# Patient Record
(demographics unavailable — no encounter records)

---

## 2024-12-06 NOTE — HISTORY OF PRESENT ILLNESS
[FreeTextEntry1] : 36 year male with a PMH Insomnia, ADHD, mild hyperlipidemia is coming in for  for pre-employment screening and immunizations. [de-identified] : 36 year male with a PMH Insomnia, ADHD, mild hyperlipidemia is coming in for  for pre-employment screening and immunizations.  He is requesting bw for tb screening, titers for work.   No other acute complaints.

## 2024-12-06 NOTE — HISTORY OF PRESENT ILLNESS
[FreeTextEntry1] : 36 year male with a PMH Insomnia, ADHD, mild hyperlipidemia is coming in for  for pre-employment screening and immunizations. [de-identified] : 36 year male with a PMH Insomnia, ADHD, mild hyperlipidemia is coming in for  for pre-employment screening and immunizations.  He is requesting bw for tb screening, titers for work.   No other acute complaints.

## 2024-12-06 NOTE — ASSESSMENT
[FreeTextEntry1] : 36 year male with a PMH Insomnia, ADHD, mild hyperlipidemia is coming in for bw for pre-employment screening and immunizations.  #Pre-employment screening -Measles/Mumps/Rubella/Varicella titers ordered  -Quant ordered -Hep B Surface Ab ordered  #Encounter for immunizations -TDAP given today -Patient defers on flu vaccine  #Hyperkalemia -Mild, noted on last bw -Repeat BMP today   F/u w/ PCP

## 2025-05-23 NOTE — PHYSICAL EXAM

## 2025-05-23 NOTE — HISTORY OF PRESENT ILLNESS
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 Denied any NVCD abd pain or oropharyngeal issues incl dental caries/gum disease Not a smoker ,does not abuse NSAIDS/ETOH.No FMH of IBD or GI cancer. No hx of abd surgeries.Appetite is good , no wt loss.